# Patient Record
Sex: FEMALE | Race: ASIAN | NOT HISPANIC OR LATINO | URBAN - METROPOLITAN AREA
[De-identification: names, ages, dates, MRNs, and addresses within clinical notes are randomized per-mention and may not be internally consistent; named-entity substitution may affect disease eponyms.]

---

## 2017-01-01 ENCOUNTER — EMERGENCY (EMERGENCY)
Facility: HOSPITAL | Age: 72
LOS: 1 days | End: 2017-01-01
Attending: EMERGENCY MEDICINE | Admitting: EMERGENCY MEDICINE
Payer: MEDICARE

## 2017-01-01 LAB
ALBUMIN SERPL ELPH-MCNC: 4.1 G/DL — SIGNIFICANT CHANGE UP (ref 3.3–5)
ALP SERPL-CCNC: 59 U/L — SIGNIFICANT CHANGE UP (ref 40–120)
ALT FLD-CCNC: 26 U/L — SIGNIFICANT CHANGE UP (ref 4–33)
APTT BLD: 35.2 SEC — SIGNIFICANT CHANGE UP (ref 27.5–37.4)
AST SERPL-CCNC: 41 U/L — HIGH (ref 4–32)
BASE EXCESS BLDV CALC-SCNC: -6.6 MMOL/L — SIGNIFICANT CHANGE UP
BASOPHILS # BLD AUTO: 0.05 K/UL — SIGNIFICANT CHANGE UP (ref 0–0.2)
BASOPHILS NFR BLD AUTO: 0.7 % — SIGNIFICANT CHANGE UP (ref 0–2)
BILIRUB SERPL-MCNC: 0.2 MG/DL — SIGNIFICANT CHANGE UP (ref 0.2–1.2)
BLOOD GAS VENOUS - CREATININE: 0.94 MG/DL — SIGNIFICANT CHANGE UP (ref 0.5–1.3)
BUN SERPL-MCNC: 33 MG/DL — HIGH (ref 7–23)
CALCIUM SERPL-MCNC: 8.8 MG/DL — SIGNIFICANT CHANGE UP (ref 8.4–10.5)
CHLORIDE BLDV-SCNC: 106 MMOL/L — SIGNIFICANT CHANGE UP (ref 96–108)
CHLORIDE SERPL-SCNC: 99 MMOL/L — SIGNIFICANT CHANGE UP (ref 98–107)
CK MB BLD-MCNC: 2.11 NG/ML — SIGNIFICANT CHANGE UP (ref 1–4.7)
CK MB BLD-MCNC: SIGNIFICANT CHANGE UP (ref 0–2.5)
CK SERPL-CCNC: 93 U/L — SIGNIFICANT CHANGE UP (ref 25–170)
CO2 SERPL-SCNC: 20 MMOL/L — LOW (ref 22–31)
CREAT SERPL-MCNC: 1.09 MG/DL — SIGNIFICANT CHANGE UP (ref 0.5–1.3)
EOSINOPHIL # BLD AUTO: 0.08 K/UL — SIGNIFICANT CHANGE UP (ref 0–0.5)
EOSINOPHIL NFR BLD AUTO: 1.2 % — SIGNIFICANT CHANGE UP (ref 0–6)
GAS PNL BLDV: 135 MMOL/L — LOW (ref 136–146)
GLUCOSE BLDV-MCNC: 201 — HIGH (ref 70–99)
GLUCOSE SERPL-MCNC: 210 MG/DL — HIGH (ref 70–99)
HCO3 BLDV-SCNC: 16 MMOL/L — LOW (ref 20–27)
HCT VFR BLD CALC: 39.1 % — SIGNIFICANT CHANGE UP (ref 34.5–45)
HCT VFR BLDV CALC: 37.9 % — SIGNIFICANT CHANGE UP (ref 34.5–45)
HGB BLD-MCNC: 12.2 G/DL — SIGNIFICANT CHANGE UP (ref 11.5–15.5)
HGB BLDV-MCNC: 12.3 G/DL — SIGNIFICANT CHANGE UP (ref 11.5–15.5)
IMM GRANULOCYTES # BLD AUTO: 0.22 # — SIGNIFICANT CHANGE UP
IMM GRANULOCYTES NFR BLD AUTO: 3.2 % — HIGH (ref 0–1.5)
INR BLD: 1.03 — SIGNIFICANT CHANGE UP (ref 0.88–1.17)
LACTATE BLDV-MCNC: 8.1 MMOL/L — CRITICAL HIGH (ref 0.5–2)
LYMPHOCYTES # BLD AUTO: 4.01 K/UL — HIGH (ref 1–3.3)
LYMPHOCYTES # BLD AUTO: 59 % — HIGH (ref 13–44)
MAGNESIUM SERPL-MCNC: 2.5 MG/DL — SIGNIFICANT CHANGE UP (ref 1.6–2.6)
MCHC RBC-ENTMCNC: 31.2 % — LOW (ref 32–36)
MCHC RBC-ENTMCNC: 34.1 PG — HIGH (ref 27–34)
MCV RBC AUTO: 109.2 FL — HIGH (ref 80–100)
MONOCYTES # BLD AUTO: 0.52 K/UL — SIGNIFICANT CHANGE UP (ref 0–0.9)
MONOCYTES NFR BLD AUTO: 7.6 % — SIGNIFICANT CHANGE UP (ref 2–14)
NEUTROPHILS # BLD AUTO: 1.92 K/UL — SIGNIFICANT CHANGE UP (ref 1.8–7.4)
NEUTROPHILS NFR BLD AUTO: 28.3 % — LOW (ref 43–77)
NRBC # FLD: 0.14 — SIGNIFICANT CHANGE UP
NRBC FLD-RTO: 2.1 — SIGNIFICANT CHANGE UP
PCO2 BLDV: 84 MMHG — CRITICAL HIGH (ref 41–51)
PH BLDV: 7.06 PH — CRITICAL LOW (ref 7.32–7.43)
PHOSPHATE SERPL-MCNC: 5.7 MG/DL — HIGH (ref 2.5–4.5)
PLATELET # BLD AUTO: 134 K/UL — LOW (ref 150–400)
PMV BLD: 10.6 FL — SIGNIFICANT CHANGE UP (ref 7–13)
PO2 BLDV: < 24 MMHG — LOW (ref 35–40)
POTASSIUM BLDV-SCNC: 5.1 MMOL/L — HIGH (ref 3.4–4.5)
POTASSIUM SERPL-MCNC: 5.4 MMOL/L — HIGH (ref 3.5–5.3)
POTASSIUM SERPL-SCNC: 5.4 MMOL/L — HIGH (ref 3.5–5.3)
PROT SERPL-MCNC: 7.2 G/DL — SIGNIFICANT CHANGE UP (ref 6–8.3)
PROTHROM AB SERPL-ACNC: 11.6 SEC — SIGNIFICANT CHANGE UP (ref 9.8–13.1)
RBC # BLD: 3.58 M/UL — LOW (ref 3.8–5.2)
RBC # FLD: 11.9 % — SIGNIFICANT CHANGE UP (ref 10.3–14.5)
SAO2 % BLDV: 14.5 % — LOW (ref 60–85)
SODIUM SERPL-SCNC: 139 MMOL/L — SIGNIFICANT CHANGE UP (ref 135–145)
TROPONIN T SERPL-MCNC: < 0.06 NG/ML — SIGNIFICANT CHANGE UP (ref 0–0.06)
WBC # BLD: 6.8 K/UL — SIGNIFICANT CHANGE UP (ref 3.8–10.5)
WBC # FLD AUTO: 6.8 K/UL — SIGNIFICANT CHANGE UP (ref 3.8–10.5)

## 2017-01-01 PROCEDURE — 99285 EMERGENCY DEPT VISIT HI MDM: CPT

## 2017-10-14 NOTE — ED PROVIDER NOTE - PROGRESS NOTE DETAILS
Zayda: Please refer to code sheet for medications administered. Medical Examiner notified, Dr. Montenegro. Declined care, no case # attached. Zayda: Please refer to code sheet for medications administered. Medical Examiner notified, Dr. Montenegro. Declined case, no case # attached.

## 2017-10-14 NOTE — ED PROVIDER NOTE - ATTENDING CONTRIBUTION TO CARE
Attending Note (Chente): patient arrived in cardiac arrest.  ACLS protocol followed.   history provided by  through  phone. patient complained of chest pain [place] appro[place] an [place] before arrival.   no respiration or pulse upon ED arrival.

## 2017-10-14 NOTE — ED ADULT TRIAGE NOTE - CHIEF COMPLAINT QUOTE
pt. brought in from the ambulance bay by bystander [facility].  As per , pt. was shopping w/ son-in-law when she developed CP and syncopized, pt. carried to car and brought to [facility] by son-in-law.  CPR initiated in the parking lot, pt. brought back directly to TR B.  PMHx HTN, DM.

## 2017-10-14 NOTE — ED PROVIDER NOTE - OBJECTIVE STATEMENT
71F with HTN and DM 71F with HTN and DM presented in cardiac arrest. patient was noted to be shopping with her son-in-law and complaint of chest pain. Patient then passed out and was carried to the car and brought to the ED.  at bedside, Mandarin speaking gave hx via PI ID#[id number].  states patient passed out about 1 hour prior to arrival. CPR initiated immediately. Pts rhythm noted to be asystole. I/O placed for access in RLE.

## 2017-10-14 NOTE — ED ADULT NURSE NOTE - CHIEF COMPLAINT QUOTE
pt. brought in from the ambulance bay by bystander EMS.  As per , pt. was shopping w/ son-in-law when she developed CP and syncopized, pt. carried to car and brought to [facility] by son-in-law.  CPR initiated in the parking lot, pt. brought back directly to TR B.  PMHx HTN, DM.

## 2017-10-14 NOTE — ED ADULT NURSE NOTE - OBJECTIVE STATEMENT
Float RN:. Elderly female found in back seat of personal vehicle with family in ER amb bay.  Pt found to be pulseless with no respiratory activity.  CPR started upon arrival to ED.  Pt brought straight to trauma B.  Pt found to be in asystole.  rectal temp 98.2.  see code sheet in chart for med admin.  IO inserted by MD AGAPITO sotelo.  18G r arm.  TOD at 20:38    addendum:  Family does not speak english, translation service used.  Pt does not have a DNR per family.  Pt was shopping at mall and collapsed.  down time approx. 1 hour prior to start of CPR.  As per organ donation pt is a candidate for eye donation.  (Ref #[id number]/Robina)

## 2023-08-08 NOTE — ED ADULT TRIAGE NOTE - DIRECT TO ROOM CARE INITIATED:
Regarding: [employer] co pay assistance   Contact: [phone]  ----- Message from Kiya De La Garza RN sent at 8/7/2023  4:56 PM EDT -----       ----- Message from Daren Beltrán to Helena Turpin PA-C sent at 8/7/2023 12:18 PM -----   Good afternoon,    I am running into an issue with the copay assistance program regarding exceeding coverage. Is there an alternative option as $2k a month is not feasible. 14-Oct-2017 20:07